# Patient Record
Sex: MALE | Race: WHITE
[De-identification: names, ages, dates, MRNs, and addresses within clinical notes are randomized per-mention and may not be internally consistent; named-entity substitution may affect disease eponyms.]

---

## 2022-08-20 ENCOUNTER — HOSPITAL ENCOUNTER (EMERGENCY)
Dept: HOSPITAL 54 - ER | Age: 42
LOS: 1 days | Discharge: HOME | End: 2022-08-21
Payer: MEDICAID

## 2022-08-20 VITALS — BODY MASS INDEX: 19.15 KG/M2 | HEIGHT: 67 IN | WEIGHT: 122 LBS

## 2022-08-20 DIAGNOSIS — Z88.2: ICD-10-CM
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
## 2022-08-20 DIAGNOSIS — S63.617A: Primary | ICD-10-CM

## 2022-08-20 NOTE — NUR
BIBSELF C/O LEFT PINKY INJURY S/O PLAYING VOLLEYBALL. PATIENT ALERT AND 
ORIENTED X3. AMBULATORY WITH NON LABORED BREATHIGN IN BED 19 AWAITING MD ARAGON.

## 2022-08-21 VITALS — SYSTOLIC BLOOD PRESSURE: 122 MMHG | DIASTOLIC BLOOD PRESSURE: 60 MMHG
